# Patient Record
Sex: FEMALE | Race: BLACK OR AFRICAN AMERICAN
[De-identification: names, ages, dates, MRNs, and addresses within clinical notes are randomized per-mention and may not be internally consistent; named-entity substitution may affect disease eponyms.]

---

## 2018-05-22 ENCOUNTER — HOSPITAL ENCOUNTER (OUTPATIENT)
Dept: HOSPITAL 92 - SCSULT | Age: 53
Discharge: HOME | End: 2018-05-22
Attending: INTERNAL MEDICINE
Payer: COMMERCIAL

## 2018-05-22 DIAGNOSIS — E04.1: Primary | ICD-10-CM

## 2018-05-22 PROCEDURE — 76536 US EXAM OF HEAD AND NECK: CPT

## 2018-05-22 NOTE — ULT
THYROID ULTRASOUND:

 

Date:  05/22/18 

 

HISTORY:  

Recurrent thyroid nodule. 

 

COMPARISON:  

None. 

 

TECHNIQUE:  

Sagittal and transverse imaging of the thyroid gland is performed. 

 

FINDINGS:

Thyroid isthmus measures 0.2 cm. 

 

Right thyroid lobe measures 3.7 x 1.8 x 1.2 cm. 

 

Left thyroid lobe measures 3.9 x 1.6 x 1.0 cm. 

 

Slightly complex cyst in the upper to mid aspect of the right thyroid lobe measuring 0.3 cm. 

 

Solid nodule in the lower pole of the left thyroid lobe measuring 0.8 x 0.8 x 1.1 cm. 

 

IMPRESSION: 

Solitary solid nodule in the lower pole left thyroid lobe with a TI-RADS calculator score of TR4. Fol
low-up imaging in 1 year is recommended. 

 

 

POS: [facility]